# Patient Record
Sex: FEMALE | Race: AMERICAN INDIAN OR ALASKA NATIVE
[De-identification: names, ages, dates, MRNs, and addresses within clinical notes are randomized per-mention and may not be internally consistent; named-entity substitution may affect disease eponyms.]

---

## 2018-04-23 ENCOUNTER — HOSPITAL ENCOUNTER (EMERGENCY)
Dept: HOSPITAL 5 - ED | Age: 33
Discharge: HOME | End: 2018-04-23
Payer: SELF-PAY

## 2018-04-23 VITALS — DIASTOLIC BLOOD PRESSURE: 79 MMHG | SYSTOLIC BLOOD PRESSURE: 129 MMHG

## 2018-04-23 DIAGNOSIS — M54.9: ICD-10-CM

## 2018-04-23 DIAGNOSIS — F17.200: ICD-10-CM

## 2018-04-23 DIAGNOSIS — M79.671: ICD-10-CM

## 2018-04-23 DIAGNOSIS — Z90.49: ICD-10-CM

## 2018-04-23 DIAGNOSIS — M79.661: Primary | ICD-10-CM

## 2018-04-23 LAB
ALBUMIN SERPL-MCNC: 3.7 G/DL (ref 3.9–5)
ALT SERPL-CCNC: 13 UNITS/L (ref 7–56)
BUN SERPL-MCNC: 9 MG/DL (ref 7–17)
BUN/CREAT SERPL: 13 %
CALCIUM SERPL-MCNC: 8.5 MG/DL (ref 8.4–10.2)
HCT VFR BLD CALC: 35.1 % (ref 30.3–42.9)
HEMOLYSIS INDEX: 10
HGB BLD-MCNC: 12.4 GM/DL (ref 10.1–14.3)
MCH RBC QN AUTO: 32 PG (ref 28–32)
MCHC RBC AUTO-ENTMCNC: 35 % (ref 30–34)
MCV RBC AUTO: 91 FL (ref 79–97)
PLATELET # BLD: 304 K/MM3 (ref 140–440)
RBC # BLD AUTO: 3.84 M/MM3 (ref 3.65–5.03)

## 2018-04-23 PROCEDURE — 71046 X-RAY EXAM CHEST 2 VIEWS: CPT

## 2018-04-23 PROCEDURE — 93010 ELECTROCARDIOGRAM REPORT: CPT

## 2018-04-23 PROCEDURE — 36415 COLL VENOUS BLD VENIPUNCTURE: CPT

## 2018-04-23 PROCEDURE — 85379 FIBRIN DEGRADATION QUANT: CPT

## 2018-04-23 PROCEDURE — 80053 COMPREHEN METABOLIC PANEL: CPT

## 2018-04-23 PROCEDURE — 93005 ELECTROCARDIOGRAM TRACING: CPT

## 2018-04-23 PROCEDURE — 85027 COMPLETE CBC AUTOMATED: CPT

## 2018-04-23 NOTE — XRAY REPORT
FINAL REPORT



PROCEDURE:  XR CHEST ROUTINE 2V



TECHNIQUE:  PA and lateral chest radiographs were obtained. CPT

14674







HISTORY:  sob 



COMPARISON:  No prior studies are available for comparison.



FINDINGS:  

Heart: Normal.



Mediastinum/Vessels: Normal.



Lungs/Pleural space: No infiltrate, effusion, or pneumothorax.



Bony thorax: No acute osseous abnormality.



Other: 



IMPRESSION:  

No radiographic evidence of acute abnormality

## 2018-04-23 NOTE — EMERGENCY DEPARTMENT REPORT
HPI





- General


Chief Complaint: Dyspnea/Respdistress


Time Seen by Provider: 18 17:30





- HPI


HPI: 





Patient is a 32-year-old female who presents to ED complaining of right leg 

pain and upper back pain times to 3 days.  Patient states that she is having 

leg pain for the past couple of days.  Patient states she feels that she's been 

overworking under a lot of stress causing her pain on her right lower leg.  

Patient had initially complained of shortness of breath from back pain but not 

at the moment.  Patient denies fever/chills/nausea vomiting headache/chest pain/

fall or injuries





ED Past Medical Hx





- Past Medical History


Hx Hypertension: No


Hx Congestive Heart Failure: No


Hx Diabetes: No


Hx Deep Vein Thrombosis: No


Hx Renal Disease: No


Hx Sickle Cell Disease: No


Hx Seizures: No


Hx Asthma: No


Hx COPD: No


Hx HIV: No


Additional medical history: OBESITY





- Surgical History


Hx Cholecystectomy: Yes





- Social History


Smoking Status: Current Every Day Smoker


Substance Use Type: None





- Medications


Home Medications: 


 Home Medications











 Medication  Instructions  Recorded  Confirmed  Last Taken  Type


 


Cyclobenzaprine [Flexeril] 10 mg PO QHS PRN #20 tablet 18  Unknown Rx


 


Ibuprofen [Motrin 800 MG tab] 800 mg PO Q8HR PRN #15 tablet 18  Unknown Rx














ED Review of Systems


ROS: 


Stated complaint: CHEST PAIN/RIGHT SIDE SWOLLEN


Other details as noted in HPI





Constitutional: denies: chills, fever


Eyes: denies: eye pain, eye discharge, vision change


ENT: denies: ear pain, throat pain


Respiratory: denies: cough, shortness of breath, wheezing


Cardiovascular: denies: chest pain, palpitations


Endocrine: no symptoms reported


Gastrointestinal: denies: abdominal pain, nausea, diarrhea


Genitourinary: denies: urgency, dysuria, discharge


Musculoskeletal: denies: back pain, joint swelling, arthralgia


Skin: denies: rash, lesions


Neurological: denies: headache, weakness, paresthesias


Psychiatric: denies: anxiety, depression


Hematological/Lymphatic: denies: easy bleeding, easy bruising





Physical Exam





- Physical Exam


Vital Signs: 


 Vital Signs











  18





  13:24


 


Temperature 98 F


 


Pulse Rate 85


 


Respiratory 22





Rate 


 


Blood Pressure 129/79


 


O2 Sat by Pulse 97





Oximetry 











Physical Exam: 





GENERAL: Alert and oriented x3, no apparent distress, Normal Gait, atraumatic.


HEAD: Head is normocephalic and a-traumatic.





NECK: Supple. Non edematous,   No lymphadenopathy or thyromegaly.  No C-spine 

tenderness, full range of motion


LUNGS: Symetrical with respiration, No wheezing, no rales or crackles, CTAB.


HEART:  S1, S2 present, regular rate and rhythm without murmur, no rubs, no 

gallops. Non tender to palpation





BACK: Full range of motion, no spinal tenderness,  Tenderness to palpation of 

the trapezius muscles and latissimus dorsi muscles of the back





EXTREMITIES/MUSCULOSKELETAL: No cyanosis, clubbing, rash, lesions or edema in 

all lower extremities.  Nontender to palpation ,Homans sign negative Full ROM 

bilaterally. UE/LE Pulses 2+ bilaterally.  LE and UE 5+ strength bilaterally, 


NEUROLOGIC:  The patient is cooperative with no focal neurologic deficits. 


SKIN:  Warm and dry, No lesions, No ulceration or induration present.





ED Course


 Vital Signs











  18





  13:24


 


Temperature 98 F


 


Pulse Rate 85


 


Respiratory 22





Rate 


 


Blood Pressure 129/79


 


O2 Sat by Pulse 97





Oximetry 














ED Medical Decision Making





- Lab Data


Result diagrams: 


 18 14:14





 18 14:14





- Radiology Data


Radiology results: report reviewed, image reviewed





SHEREEN MACKENZIE   Female : 1985  [facility]# [id number]





18 14:08 - Radiology Dept. Note by RADHA RICO


 [facility] Num: [id number]  : 1985  Patient Age: 32





RLE VENOUS DUPLEX COMPLETED. VAS LAB PRELIMINARY REPORT; NO EVIDENCE OF DVT/SVT 

NOTED IN VESSELS/SEGMENTS EXAMINED. PHYSICIANS REPORT TO FOLLOW...(RSK)


Initialized on 18 14:08 - END OF NOTE








HISTORY: sob 





COMPARISON: No prior studies are available for comparison. 





FINDINGS: 


Heart: Normal. 





Mediastinum/Vessels: Normal. 





Lungs/Pleural space: No infiltrate, effusion, or pneumothorax. 





Bony thorax: No acute osseous abnormality. 





Other: 





IMPRESSION: 


No radiographic evidence of acute abnormality 











Transcribed By: [facility] 


Dictated By: ADDY HARRIS M.D. 


Electronically Authenticated By: ADDY HARRIS M.D. 


Signed Date/Time: 18 185 











- Medical Decision Making


39-year-old female presents to ED with myalgia off the right lower leg


ED course: Patient received Motrin and Flexeril in ED.


D-dimer, chest x-ray, Doppler studies performed.  All tests within normal 

limits.  No acute findings


I discussed this findings with the patient, I discussed the patient to get some 

rest, take medication as prescribed 


Vital signs are normal patient is in no acute distress


Discussed with patient follow-up with primary care physician.  


Discussed the patient and take medications as prescribed. 


Patient has no neurological deficit.  Patient is alert and oriented 3  and 

understands all instructions given.


 Discussed  drowsiness effect of Flexeril makes her drowsy and not to operate 

machinery while taking flexeril





Critical care attestation.: 


If time is entered above; I have spent that time in minutes in the direct care 

of this critically ill patient, excluding procedure time.








ED Disposition


Clinical Impression: 


 Myalgia, Arthralgia of foot, right





Disposition: DC-01 TO HOME OR SELFCARE


Is pt being admited?: No


Does the pt Need Aspirin: No


Condition: Stable


Instructions:  Trigger Point Pain (ED), Musculoskeletal Pain (ED), Arthralgia (

ED)


Additional Instructions: 


Make sure to follow up with the primary care physician as discussed.


Take all your medications as you've been prescribed.


If you have any worsening symptoms or develop new symptoms please return to ED 

immediately.


Prescriptions: 


Cyclobenzaprine [Flexeril] 10 mg PO QHS PRN #20 tablet


 PRN Reason: Muscle Spasm


Ibuprofen [Motrin 800 MG tab] 800 mg PO Q8HR PRN #15 tablet


 PRN Reason: Pain


Referrals: 


PRIMARY CARE,MD [Primary Care Provider] - 3-5 Days


[facility] [place] [Outside] - 3-5 Days


[facility] [Outside] - 3-5 Days


[facility] [Outside] - 3-5 Days


Forms:  Accompanied Note, Work/School Release Form(ED)


Time of Disposition: 19:27